# Patient Record
Sex: MALE | Race: ASIAN | Employment: UNEMPLOYED | URBAN - METROPOLITAN AREA
[De-identification: names, ages, dates, MRNs, and addresses within clinical notes are randomized per-mention and may not be internally consistent; named-entity substitution may affect disease eponyms.]

---

## 2021-01-15 DIAGNOSIS — Z23 NEED FOR VACCINATION: ICD-10-CM

## 2023-03-01 ENCOUNTER — APPOINTMENT (OUTPATIENT)
Dept: URGENT CARE | Facility: PHYSICIAN GROUP | Age: 78
End: 2023-03-01

## 2023-03-01 ENCOUNTER — OFFICE VISIT (OUTPATIENT)
Dept: URGENT CARE | Facility: PHYSICIAN GROUP | Age: 78
End: 2023-03-01
Payer: MEDICARE

## 2023-03-01 VITALS
WEIGHT: 126 LBS | HEIGHT: 60 IN | SYSTOLIC BLOOD PRESSURE: 122 MMHG | HEART RATE: 74 BPM | BODY MASS INDEX: 24.74 KG/M2 | OXYGEN SATURATION: 97 % | RESPIRATION RATE: 20 BRPM | TEMPERATURE: 98.7 F | DIASTOLIC BLOOD PRESSURE: 82 MMHG

## 2023-03-01 DIAGNOSIS — L50.9 URTICARIA: ICD-10-CM

## 2023-03-01 PROCEDURE — 99203 OFFICE O/P NEW LOW 30 MIN: CPT | Performed by: PHYSICIAN ASSISTANT

## 2023-03-01 RX ORDER — PREDNISONE 10 MG/1
20 TABLET ORAL 2 TIMES DAILY
Qty: 20 TABLET | Refills: 0 | Status: SHIPPED | OUTPATIENT
Start: 2023-03-01 | End: 2023-03-06

## 2023-03-01 ASSESSMENT — ENCOUNTER SYMPTOMS
MUSCULOSKELETAL NEGATIVE: 1
CONSTITUTIONAL NEGATIVE: 1
RESPIRATORY NEGATIVE: 1
NEUROLOGICAL NEGATIVE: 1
CARDIOVASCULAR NEGATIVE: 1

## 2023-03-02 NOTE — PROGRESS NOTES
Subjective:     Bear Thao  is a 77 y.o. male who presents for Urticaria (All over body since 2/27/2023)       He presents today with generalized whole body urticarial reaction has been present since 2/27/2023.  He does state that the affected skin is pruritic in nature.  No recent lifestyle changes.  No swelling of the face or oral mucosa.  Denies fever/chills/sweats, chest pain, shortness of breath, nausea/vomiting, abdominal pain, diarrhea.     Review of Systems   Constitutional: Negative.    Respiratory: Negative.     Cardiovascular: Negative.    Musculoskeletal: Negative.    Skin:  Positive for itching and rash.   Neurological: Negative.     No Known Allergies  History reviewed. No pertinent past medical history.     Objective:   /82   Pulse 74   Temp 37.1 °C (98.7 °F) (Temporal)   Resp 20   Ht 1.524 m (5')   Wt 57.2 kg (126 lb)   SpO2 97%   BMI 24.61 kg/m²   Physical Exam  Vitals and nursing note reviewed.   Constitutional:       General: He is not in acute distress.     Appearance: He is not ill-appearing or toxic-appearing.   HENT:      Head: Normocephalic.      Comments: Examination of the head, mouth, oral mucosa were negative for angioedema or soft tissue swelling.     Nose: No rhinorrhea.   Eyes:      General: No scleral icterus.     Conjunctiva/sclera: Conjunctivae normal.   Pulmonary:      Effort: Pulmonary effort is normal. No respiratory distress.      Breath sounds: No stridor.   Musculoskeletal:      Cervical back: Neck supple.   Skin:     Comments: Skin examination does reveal urticarial hives present over various parts of the body to include chest, neck, scalp, back, upper extremities.   Neurological:      Mental Status: He is alert and oriented to person, place, and time.   Psychiatric:         Mood and Affect: Mood normal.         Behavior: Behavior normal.         Thought Content: Thought content normal.         Judgment: Judgment normal.           Diagnostic  testing: None    Assessment/Plan:     Encounter Diagnoses   Name Primary?    Urticaria           Plan for care for today's complaint includes prednisone, over-the-counter antihistamine medication for urticarial rash. moisturize following did discuss with the patient that he should moisturize following bathing.  No evidence of angioedema or oral mucosa swelling on exam today.  Vital signs are stable patient is well-appearing.  Continue to monitor symptoms and return to urgent care or follow-up with primary care provider if symptoms remain ongoing.  Follow-up in the emergency department if symptoms become severe, ER precautions discussed in office today..  Prescription for prednisone provided.    See AVS Instructions below for written guidance provided to patient on after-visit management and care in addition to our verbal discussion during the visit.    Please note that this dictation was created using voice recognition software. I have attempted to correct all errors, but there may be sound-alike, spelling, grammar and possibly content errors that I did not discover before finalizing the note.    Dieudonne White PA-C

## 2023-06-20 ENCOUNTER — OFFICE VISIT (OUTPATIENT)
Dept: URGENT CARE | Facility: PHYSICIAN GROUP | Age: 78
End: 2023-06-20
Payer: MEDICARE

## 2023-06-20 VITALS
OXYGEN SATURATION: 98 % | DIASTOLIC BLOOD PRESSURE: 64 MMHG | HEART RATE: 85 BPM | TEMPERATURE: 97.8 F | WEIGHT: 122 LBS | RESPIRATION RATE: 18 BRPM | BODY MASS INDEX: 21.62 KG/M2 | SYSTOLIC BLOOD PRESSURE: 128 MMHG | HEIGHT: 63 IN

## 2023-06-20 DIAGNOSIS — M10.071 ACUTE IDIOPATHIC GOUT OF RIGHT FOOT: ICD-10-CM

## 2023-06-20 PROCEDURE — 3078F DIAST BP <80 MM HG: CPT | Performed by: NURSE PRACTITIONER

## 2023-06-20 PROCEDURE — 99214 OFFICE O/P EST MOD 30 MIN: CPT | Performed by: NURSE PRACTITIONER

## 2023-06-20 PROCEDURE — 3074F SYST BP LT 130 MM HG: CPT | Performed by: NURSE PRACTITIONER

## 2023-06-20 RX ORDER — COLCHICINE 0.6 MG/1
0.6 TABLET ORAL 2 TIMES DAILY
COMMUNITY
Start: 2023-06-02

## 2023-06-20 RX ORDER — PREDNISONE 20 MG/1
TABLET ORAL
Qty: 10 TABLET | Refills: 0 | Status: SHIPPED | OUTPATIENT
Start: 2023-06-20

## 2023-06-20 ASSESSMENT — ENCOUNTER SYMPTOMS
DIARRHEA: 0
TINGLING: 0
FEVER: 0
NAUSEA: 0
MYALGIAS: 0
SENSORY CHANGE: 0
CHILLS: 0
HEADACHES: 0

## 2023-06-20 NOTE — PROGRESS NOTES
Subjective     Bear Thao is a 77 y.o. male who presents with Foot Problem (X 2 wk Rt foot pain, swelling)            HPI  New.  Patient is a 77-year-old male who presents with first metatarsal foot pain, and swelling for approximately 2 weeks.  The pain has worsened over time despite him taking colchicine.  He denies fever, chills, other joint pain, nausea, or diarrhea.  He does have a history of periodic episodes of gout in his past.    Patient has no known allergies.  Current Outpatient Medications on File Prior to Visit   Medication Sig Dispense Refill    colchicine (COLCRYS) 0.6 MG Tab Take 0.6 mg by mouth 2 times a day.       No current facility-administered medications on file prior to visit.     Social History     Socioeconomic History    Marital status:      Spouse name: Not on file    Number of children: Not on file    Years of education: Not on file    Highest education level: Not on file   Occupational History    Not on file   Tobacco Use    Smoking status: Never    Smokeless tobacco: Never   Vaping Use    Vaping Use: Never used   Substance and Sexual Activity    Alcohol use: Yes    Drug use: Never    Sexual activity: Not on file   Other Topics Concern    Not on file   Social History Narrative    Not on file     Social Determinants of Health     Financial Resource Strain: Not on file   Food Insecurity: Not on file   Transportation Needs: Not on file   Physical Activity: Not on file   Stress: Not on file   Social Connections: Not on file   Intimate Partner Violence: Not on file   Housing Stability: Not on file     Breast Cancer-related family history is not on file.      Review of Systems   Constitutional:  Negative for chills, fever and malaise/fatigue.   Gastrointestinal:  Negative for diarrhea and nausea.   Musculoskeletal:  Positive for joint pain. Negative for myalgias.   Neurological:  Negative for tingling, sensory change and headaches.              Objective     /64 (BP  "Location: Left arm, Patient Position: Sitting, BP Cuff Size: Adult)   Pulse 85   Temp 36.6 °C (97.8 °F) (Temporal)   Resp 18   Ht 1.6 m (5' 3\")   Wt 55.3 kg (122 lb)   SpO2 98%   BMI 21.61 kg/m²      Physical Exam  Constitutional:       Appearance: Normal appearance. He is not ill-appearing.   Cardiovascular:      Rate and Rhythm: Normal rate and regular rhythm.      Heart sounds: No murmur heard.  Pulmonary:      Effort: Pulmonary effort is normal.      Breath sounds: Normal breath sounds.   Musculoskeletal:      Right foot: Decreased range of motion. Swelling, tenderness and bony tenderness present.        Legs:    Skin:     General: Skin is warm and dry.      Capillary Refill: Capillary refill takes less than 2 seconds.   Neurological:      General: No focal deficit present.      Mental Status: He is alert and oriented to person, place, and time.                             Assessment & Plan        1. Acute idiopathic gout of right foot  predniSONE (DELTASONE) 20 MG Tab        Differential diagnosis, natural history, supportive care, and indications for immediate follow-up were discussed.                   "

## 2024-02-12 ENCOUNTER — APPOINTMENT (OUTPATIENT)
Dept: URGENT CARE | Facility: PHYSICIAN GROUP | Age: 79
End: 2024-02-12
Payer: COMMERCIAL

## 2024-03-19 ENCOUNTER — HOSPITAL ENCOUNTER (OUTPATIENT)
Dept: LAB | Facility: MEDICAL CENTER | Age: 79
End: 2024-03-19
Attending: INTERNAL MEDICINE
Payer: MEDICARE

## 2024-03-19 LAB
CANCER AG19-9 SERPL-ACNC: 1804 U/ML (ref 0–35)
FERRITIN SERPL-MCNC: 1483 NG/ML (ref 22–322)
FOLATE SERPL-MCNC: 11.4 NG/ML
IRON SATN MFR SERPL: 39 % (ref 15–55)
IRON SERPL-MCNC: 98 UG/DL (ref 50–180)
TIBC SERPL-MCNC: 251 UG/DL (ref 250–450)
UIBC SERPL-MCNC: 153 UG/DL (ref 110–370)
VIT B12 SERPL-MCNC: 973 PG/ML (ref 211–911)

## 2024-03-19 PROCEDURE — 83550 IRON BINDING TEST: CPT

## 2024-03-19 PROCEDURE — 82746 ASSAY OF FOLIC ACID SERUM: CPT

## 2024-03-19 PROCEDURE — 83540 ASSAY OF IRON: CPT

## 2024-03-19 PROCEDURE — 86301 IMMUNOASSAY TUMOR CA 19-9: CPT | Mod: GA

## 2024-03-19 PROCEDURE — 82607 VITAMIN B-12: CPT

## 2024-03-19 PROCEDURE — 36415 COLL VENOUS BLD VENIPUNCTURE: CPT

## 2024-03-19 PROCEDURE — 82728 ASSAY OF FERRITIN: CPT

## 2024-04-16 PROBLEM — K57.92 DIVERTICULITIS: Status: ACTIVE | Noted: 2024-04-16

## 2024-04-16 PROBLEM — D50.9 MICROCYTIC ANEMIA: Status: ACTIVE | Noted: 2024-04-16

## 2024-04-16 PROBLEM — K86.89 PANCREATIC MASS: Status: ACTIVE | Noted: 2024-04-16

## 2024-04-16 PROBLEM — I10 HTN (HYPERTENSION): Status: ACTIVE | Noted: 2024-04-16

## 2024-04-16 PROBLEM — N32.89 BLADDER WALL THICKENING: Status: ACTIVE | Noted: 2024-04-16

## 2024-04-16 NOTE — PROGRESS NOTES
Subjective:   4/17/2024  9:20 AM  Primary care physician: Pcp Pt States None  Referring Provider: Dr Parsons    Chief Complaint:   Chief Complaint   Patient presents with    New Patient     PANC BODY MASS  CT 3/25: 3.4 CM ([facility])  EUS: SPLEN VEIN OCCL  PATH: SUSPICIUOUS  CA 19-9: 1804        Diagnosis:   1. 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 High serum carbohydrate antigen 19-9 ()        3. Abdominal pain, unspecified abdominal location        4. Abnormal CT of the abdomen          History of presenting illness:    Bear Thao is a pleasant 78 y.o. male who presented I ER earlier this year with mild continuous epigastric pain.      on 3/19/24 very elevated at 1804.    CT ABDOMEN on 3/25/24 noted a 3.4 cm pancreatic body mass with splenic vein occlusion. Patient also reported recent weight loss of about 3 pounds.    EUS on 3/29/24 by Dr Parsons noted pancreas body mass, invasive into splenic vein with occlusion, irregular borders, at least 18 x 31 mm.    Pathology on 3/29/24 noted pancreas body with atypical cells, suspicious for malignancy    He is here today for surgical evaluation.  He was noting vague upper abdominal pain for about a month now and was evaluated in the urgent care 1 point prescribed some pain medication but did not have relief of his symptoms therefore CT scan was ultimately performed.  This showed a 3 and half centimeter mass in the body of the pancreas with splenic vein occlusion and on my review appears to be also involving the celiac axis.  He had an EUS with a biopsy by Dr. Parsons which showed atypical cells concerning for malignancy.  Today in clinic he states he still having ongoing abdominal pain.  He is taking oxycodone for this and does get some relief from it.  He is eating and having regular bowel movements.  He is never had any episodes of jaundice.  His most limiting symptom is his  pain.  He has had about a 6 pound weight loss over the last few weeks.  His mother had lung cancer but no personal history of cancer.  He is got no other significant medical problems.  Does not smoke drink or use recreational drugs.    No past medical history on file.  No past surgical history on file.  Allergies   Allergen Reactions    Ibuprofen Nausea     Other Reaction(s): Stomach Pain     Outpatient Encounter Medications as of 4/17/2024   Medication Sig Dispense Refill    oxyCODONE immediate-release (ROXICODONE) 5 MG Tab TAKE 1 TABLET BY MOUTH EVERY 6 HOURS AS NEEDED FOR 3 DAYS FOR UNCONTROLLED PAIN      colchicine (COLCRYS) 0.6 MG Tab Take 0.6 mg by mouth 2 times a day.      predniSONE (DELTASONE) 20 MG Tab Take 2 tabs daily for 5 days. 10 Tablet 0     No facility-administered encounter medications on file as of 4/17/2024.     Social History     Socioeconomic History    Marital status:      Spouse name: Not on file    Number of children: Not on file    Years of education: Not on file    Highest education level: Not on file   Occupational History    Not on file   Tobacco Use    Smoking status: Never    Smokeless tobacco: Never   Vaping Use    Vaping Use: Never used   Substance and Sexual Activity    Alcohol use: Yes    Drug use: Never    Sexual activity: Not on file   Other Topics Concern    Not on file   Social History Narrative    Not on file     Social Determinants of Health     Financial Resource Strain: Not on file   Food Insecurity: Not on file   Transportation Needs: Not on file   Physical Activity: Not on file   Stress: Not on file   Social Connections: Not on file   Intimate Partner Violence: Not on file   Housing Stability: Not on file      Social History     Tobacco Use   Smoking Status Never   Smokeless Tobacco Never     Social History     Substance and Sexual Activity   Alcohol Use Yes     Social History     Substance and Sexual Activity   Drug Use Never      No family history on  "file.    Review of Systems   Constitutional:  Negative for chills, fever, malaise/fatigue and weight loss.   HENT:  Positive for hearing loss. Negative for congestion, ear discharge, ear pain and nosebleeds.    Eyes:  Negative for blurred vision, double vision, photophobia, pain and discharge.   Respiratory:  Negative for cough, hemoptysis and sputum production.    Cardiovascular:  Negative for chest pain, palpitations, orthopnea and claudication.   Gastrointestinal:  Positive for abdominal pain and constipation. Negative for diarrhea, heartburn, nausea and vomiting.   Genitourinary:  Positive for frequency. Negative for dysuria, hematuria and urgency.   Musculoskeletal:  Negative for back pain, joint pain, myalgias and neck pain.   Skin:  Negative for itching and rash.   Neurological:  Negative for dizziness, tingling, tremors, sensory change, speech change, focal weakness and headaches.   Endo/Heme/Allergies:  Negative for environmental allergies. Does not bruise/bleed easily.   Psychiatric/Behavioral:  Negative for depression, hallucinations, substance abuse and suicidal ideas. The patient has insomnia. The patient is not nervous/anxious.         Objective:   /64 (BP Location: Left arm, Patient Position: Sitting)   Pulse 94   Temp 36.7 °C (98.1 °F) (Temporal)   Ht 1.575 m (5' 2\")   Wt 49.9 kg (110 lb)   SpO2 99%   BMI 20.12 kg/m²     Physical Exam  Constitutional:       General: He is not in acute distress.     Appearance: He is not ill-appearing.   HENT:      Head: Normocephalic.   Eyes:      General: No scleral icterus.     Pupils: Pupils are equal, round, and reactive to light.   Cardiovascular:      Rate and Rhythm: Normal rate and regular rhythm.   Pulmonary:      Effort: Pulmonary effort is normal. No respiratory distress.   Abdominal:      General: Abdomen is flat. There is no distension.      Palpations: Abdomen is soft.      Tenderness: There is abdominal tenderness.      Comments: Soft.  " Mildly tender in the upper abdomen and left upper quadrant.  No rebound or guarding.   Skin:     Coloration: Skin is not jaundiced.   Neurological:      General: No focal deficit present.      Mental Status: He is alert and oriented to person, place, and time.         Labs     Latest Reference Range & Units 03/19/24 08:39   Ca 19-9 0.0 - 35.0 U/mL 1804.0 (H)   (H): Data is abnormally high    Imaging  CT ABDOMEN (3/25/24)  IMPRESSION:  1. Approximately 3.4 cm mass in the distal body of the pancreas highly suspicious for malignant neoplastic process. This mass is impressing upon and probably invading into the splenic vein and the splenic vein is likely thrombosed.  2. There is a polypoid mass in the anterior superior aspect of the urinary bladder measuring 1.7 x 1.5 cm and a malignant transitional cell carcinoma is not excluded.  3. Diverticulosis in the colon.  4. Prostate gland is enlarged and is impressing upon the base of the urinary bladder.     Pathology  Path (3/29/24)      Procedures  EUS on 3/29/24    Diagnosis:     1. 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 High serum carbohydrate antigen 19-9 ()        3. Abdominal pain, unspecified abdominal location        4. Abnormal CT of the abdomen            Medical Decision Making:  Today's Assessment / Status / Plan:     78-year-old male with a newly diagnosed mass in the body of the pancreas.  EUS biopsy showed atypical cells which are consistent with a likely malignancy.  On my review of the imaging this appears to involve the body of the pancreas heading down and involving the celiac axis.  CA 19-9 is 1800    I had a long discussion with the patient and daughter today in regards to the imaging findings and biopsy results.  I explained to them that this is consistent with a pancreatic cancer.  I discussed the prognosis, staging and management options for tumor in this location.  On my review this does not appear to be upfront resectable  and would need to respond significantly to treatment in order to be surgically resectable especially in somebody at his age.  I classifies it as locally advanced based on his imaging.  I have ordered a CT pancreas protocol, PET scan, referral to palliative care and has already been referred to medical oncology to have contacted who will get him in to discuss systemic therapy options.  I also discussed with the patient that should he need a port we will get that taken care of and placed that surgically if Dr. Sorensen feels like he will need 1 for administration of his chemotherapy.  Patient and family asked many great questions all which were answered and they are in agreement with the plan as outlined.      I, Chung Odell MD have entered, reviewed and confirmed the above diagnosis related to this patient on this date of service, April 17, 2024     Chung Odell M.D.

## 2024-04-17 ENCOUNTER — OFFICE VISIT (OUTPATIENT)
Dept: SURGICAL ONCOLOGY | Facility: MEDICAL CENTER | Age: 79
End: 2024-04-17
Payer: COMMERCIAL

## 2024-04-17 ENCOUNTER — HOSPITAL ENCOUNTER (OUTPATIENT)
Dept: RADIOLOGY | Facility: MEDICAL CENTER | Age: 79
End: 2024-04-17

## 2024-04-17 VITALS
HEART RATE: 94 BPM | HEIGHT: 62 IN | WEIGHT: 110 LBS | DIASTOLIC BLOOD PRESSURE: 64 MMHG | OXYGEN SATURATION: 99 % | SYSTOLIC BLOOD PRESSURE: 118 MMHG | TEMPERATURE: 98.1 F | BODY MASS INDEX: 20.24 KG/M2

## 2024-04-17 DIAGNOSIS — G89.3 CANCER ASSOCIATED PAIN: ICD-10-CM

## 2024-04-17 DIAGNOSIS — R79.89 HIGH SERUM CARBOHYDRATE ANTIGEN 19-9 (CA19-9): ICD-10-CM

## 2024-04-17 DIAGNOSIS — R10.9 ABDOMINAL PAIN, UNSPECIFIED ABDOMINAL LOCATION: ICD-10-CM

## 2024-04-17 DIAGNOSIS — C25.9 PANCREATIC ADENOCARCINOMA (HCC): ICD-10-CM

## 2024-04-17 DIAGNOSIS — D37.8 NEOPLASM OF UNCERTAIN BEHAVIOR OF BODY OF PANCREAS: ICD-10-CM

## 2024-04-17 DIAGNOSIS — R93.5 ABNORMAL CT OF THE ABDOMEN: ICD-10-CM

## 2024-04-17 PROCEDURE — 3078F DIAST BP <80 MM HG: CPT | Performed by: SURGERY

## 2024-04-17 PROCEDURE — 3074F SYST BP LT 130 MM HG: CPT | Performed by: SURGERY

## 2024-04-17 PROCEDURE — 99204 OFFICE O/P NEW MOD 45 MIN: CPT | Performed by: SURGERY

## 2024-04-17 RX ORDER — OXYCODONE HYDROCHLORIDE 5 MG/1
5 TABLET ORAL EVERY 4 HOURS PRN
Qty: 30 TABLET | Refills: 0 | Status: SHIPPED | OUTPATIENT
Start: 2024-04-17 | End: 2024-04-24

## 2024-04-17 RX ORDER — OXYCODONE HYDROCHLORIDE 5 MG/1
TABLET ORAL
COMMUNITY
Start: 2024-03-25

## 2024-04-17 ASSESSMENT — ENCOUNTER SYMPTOMS
TINGLING: 0
SPUTUM PRODUCTION: 0
DIARRHEA: 0
MYALGIAS: 0
HEARTBURN: 0
HEADACHES: 0
WEIGHT LOSS: 0
FOCAL WEAKNESS: 0
HEMOPTYSIS: 0
VOMITING: 0
DIZZINESS: 0
NAUSEA: 0
TREMORS: 0
SPEECH CHANGE: 0
BLURRED VISION: 0
EYE DISCHARGE: 0
ABDOMINAL PAIN: 1
BACK PAIN: 0
HALLUCINATIONS: 0
ORTHOPNEA: 0
CLAUDICATION: 0
COUGH: 0
SENSORY CHANGE: 0
CONSTIPATION: 1
CHILLS: 0
INSOMNIA: 1
NECK PAIN: 0
NERVOUS/ANXIOUS: 0
PHOTOPHOBIA: 0
DOUBLE VISION: 0
DEPRESSION: 0
EYE PAIN: 0
FEVER: 0
PALPITATIONS: 0
BRUISES/BLEEDS EASILY: 0

## 2024-04-17 ASSESSMENT — LIFESTYLE VARIABLES: SUBSTANCE_ABUSE: 0

## 2024-04-19 ENCOUNTER — TELEPHONE (OUTPATIENT)
Dept: SURGICAL ONCOLOGY | Facility: MEDICAL CENTER | Age: 79
End: 2024-04-19
Payer: COMMERCIAL

## 2024-04-22 ENCOUNTER — TELEPHONE (OUTPATIENT)
Dept: HEMATOLOGY ONCOLOGY | Facility: MEDICAL CENTER | Age: 79
End: 2024-04-22
Payer: COMMERCIAL

## 2024-04-30 ENCOUNTER — TELEPHONE (OUTPATIENT)
Dept: SURGICAL ONCOLOGY | Facility: MEDICAL CENTER | Age: 79
End: 2024-04-30
Payer: COMMERCIAL

## 2024-04-30 NOTE — TELEPHONE ENCOUNTER
Patient's daughter Pam gave us a call today to let us know that the imaging orders have not been received by [facility], she gave me a direct fax number so I faxed that over to [phone].

## 2024-05-09 ENCOUNTER — HOSPITAL ENCOUNTER (OUTPATIENT)
Dept: HEMATOLOGY ONCOLOGY | Facility: MEDICAL CENTER | Age: 79
End: 2024-05-09
Attending: STUDENT IN AN ORGANIZED HEALTH CARE EDUCATION/TRAINING PROGRAM
Payer: COMMERCIAL

## 2024-05-09 ENCOUNTER — HOSPITAL ENCOUNTER (OUTPATIENT)
Dept: HEMATOLOGY ONCOLOGY | Facility: MEDICAL CENTER | Age: 79
End: 2024-05-09
Attending: FAMILY MEDICINE
Payer: COMMERCIAL

## 2024-05-09 VITALS
HEART RATE: 86 BPM | DIASTOLIC BLOOD PRESSURE: 62 MMHG | SYSTOLIC BLOOD PRESSURE: 122 MMHG | WEIGHT: 107 LBS | BODY MASS INDEX: 19.69 KG/M2 | HEIGHT: 62 IN | RESPIRATION RATE: 16 BRPM | TEMPERATURE: 97.6 F | OXYGEN SATURATION: 99 %

## 2024-05-09 VITALS
HEIGHT: 62 IN | TEMPERATURE: 97.6 F | SYSTOLIC BLOOD PRESSURE: 122 MMHG | BODY MASS INDEX: 19.69 KG/M2 | DIASTOLIC BLOOD PRESSURE: 62 MMHG | OXYGEN SATURATION: 99 % | HEART RATE: 88 BPM | WEIGHT: 107 LBS

## 2024-05-09 DIAGNOSIS — C25.9 PANCREATIC ADENOCARCINOMA (HCC): ICD-10-CM

## 2024-05-09 DIAGNOSIS — G89.3 CANCER RELATED PAIN: ICD-10-CM

## 2024-05-09 DIAGNOSIS — K86.89 PANCREATIC MASS: ICD-10-CM

## 2024-05-09 PROCEDURE — 99205 OFFICE O/P NEW HI 60 MIN: CPT | Performed by: STUDENT IN AN ORGANIZED HEALTH CARE EDUCATION/TRAINING PROGRAM

## 2024-05-09 PROCEDURE — 99204 OFFICE O/P NEW MOD 45 MIN: CPT | Performed by: FAMILY MEDICINE

## 2024-05-09 RX ORDER — 0.9 % SODIUM CHLORIDE 0.9 %
10 VIAL (ML) INJECTION PRN
OUTPATIENT
Start: 2024-05-23

## 2024-05-09 RX ORDER — ONDANSETRON 8 MG/1
8 TABLET, ORALLY DISINTEGRATING ORAL PRN
OUTPATIENT
Start: 2024-05-23

## 2024-05-09 RX ORDER — 0.9 % SODIUM CHLORIDE 0.9 %
VIAL (ML) INJECTION PRN
OUTPATIENT
Start: 2024-05-23

## 2024-05-09 RX ORDER — PROCHLORPERAZINE MALEATE 10 MG
10 TABLET ORAL EVERY 6 HOURS PRN
OUTPATIENT
Start: 2024-05-23

## 2024-05-09 RX ORDER — 0.9 % SODIUM CHLORIDE 0.9 %
10 VIAL (ML) INJECTION PRN
OUTPATIENT
Start: 2024-05-22

## 2024-05-09 RX ORDER — 0.9 % SODIUM CHLORIDE 0.9 %
20 VIAL (ML) INJECTION PRN
OUTPATIENT
Start: 2024-05-25

## 2024-05-09 RX ORDER — EPINEPHRINE 1 MG/ML(1)
0.5 AMPUL (ML) INJECTION PRN
OUTPATIENT
Start: 2024-05-23

## 2024-05-09 RX ORDER — METHYLPREDNISOLONE SODIUM SUCCINATE 125 MG/2ML
125 INJECTION, POWDER, LYOPHILIZED, FOR SOLUTION INTRAMUSCULAR; INTRAVENOUS PRN
OUTPATIENT
Start: 2024-05-23

## 2024-05-09 RX ORDER — 0.9 % SODIUM CHLORIDE 0.9 %
VIAL (ML) INJECTION PRN
OUTPATIENT
Start: 2024-05-22

## 2024-05-09 RX ORDER — 0.9 % SODIUM CHLORIDE 0.9 %
3 VIAL (ML) INJECTION PRN
OUTPATIENT
Start: 2024-05-22

## 2024-05-09 RX ORDER — 0.9 % SODIUM CHLORIDE 0.9 %
3 VIAL (ML) INJECTION PRN
OUTPATIENT
Start: 2024-05-23

## 2024-05-09 RX ORDER — OXYCODONE HYDROCHLORIDE 5 MG/1
5 TABLET ORAL EVERY 4 HOURS PRN
Qty: 30 TABLET | Refills: 0 | Status: SHIPPED | OUTPATIENT
Start: 2024-05-09 | End: 2024-05-16

## 2024-05-09 RX ORDER — ATROPINE SULFATE 1 MG/ML
0.5 INJECTION, SOLUTION INTRAVENOUS PRN
OUTPATIENT
Start: 2024-05-23

## 2024-05-09 RX ORDER — DIPHENHYDRAMINE HYDROCHLORIDE 50 MG/ML
50 INJECTION INTRAMUSCULAR; INTRAVENOUS PRN
OUTPATIENT
Start: 2024-05-23

## 2024-05-09 RX ORDER — ONDANSETRON 2 MG/ML
4 INJECTION INTRAMUSCULAR; INTRAVENOUS PRN
OUTPATIENT
Start: 2024-05-23

## 2024-05-09 RX ORDER — ACETAMINOPHEN 325 MG/1
TABLET ORAL
COMMUNITY

## 2024-05-09 RX ORDER — DEXTROSE MONOHYDRATE 50 MG/ML
INJECTION, SOLUTION INTRAVENOUS CONTINUOUS
OUTPATIENT
Start: 2024-05-23

## 2024-05-09 RX ORDER — LOPERAMIDE HYDROCHLORIDE 2 MG/1
4 CAPSULE ORAL PRN
OUTPATIENT
Start: 2024-05-23

## 2024-05-09 RX ORDER — LOPERAMIDE HYDROCHLORIDE 2 MG/1
2 CAPSULE ORAL
OUTPATIENT
Start: 2024-05-23

## 2024-05-09 ASSESSMENT — PAIN SCALES - GENERAL: PAINLEVEL: NO PAIN

## 2024-05-09 ASSESSMENT — ENCOUNTER SYMPTOMS
HEARTBURN: 0
SPUTUM PRODUCTION: 0
CHILLS: 0
WEAKNESS: 0
BLURRED VISION: 0
DOUBLE VISION: 0
VOMITING: 0
ABDOMINAL PAIN: 1
SINUS PAIN: 0
HEADACHES: 0
NERVOUS/ANXIOUS: 0
BRUISES/BLEEDS EASILY: 0
PALPITATIONS: 0
WHEEZING: 0
NAUSEA: 0
SHORTNESS OF BREATH: 0
BACK PAIN: 1
MYALGIAS: 0
WEIGHT LOSS: 1
BLOOD IN STOOL: 0
SORE THROAT: 0
TINGLING: 0
DIAPHORESIS: 0
ORTHOPNEA: 0
CONSTIPATION: 1
INSOMNIA: 0
COUGH: 0
FEVER: 0
DIARRHEA: 0
DIZZINESS: 0

## 2024-05-09 NOTE — PROGRESS NOTES
Consult:  Hematology/Oncology      Referring Physician: Jack Odell MD  Primary Care:  Estrella Oh M.D.    Diagnosis: Pancreatic adenocarcinoma    Chief Complaint:  Evaluation for systemic therapy    History of Presenting Illness:  Bear Thao is a 78 y.o.  man who presents to the clinic for evaluation for systemic therapy for a new diagnosis of pancreatic adenocarcinoma.  The patient has been experiencing weight loss, abdominal pain, fatigue, and malaise for several months and went to the hospital for evaluation.  He had a CT scan which revealed a 3.4 cm pancreatic body mass with splenic vein occlusion, as well as involvement of the celiac axis.  A CA 19-9 was drawn at that time and was found to be 1804.  He had endoscopic ultrasound done and brushings were found to reveal atypical cells suspicious for malignancy.  He was referred to surgical oncology and due to the locally advanced nature of his disease, was felt to not be a surgical candidate at this time.  He was referred for evaluation for systemic therapy.    Interval History:  Patient is here for consultation.  He has some abdominal pain that radiates to his back, and has not been able to eat very well due to pain.  He takes oxycodone at night to help him sleep, which works well.  He has been struggling with constipation and uses laxatives to help with this.  He otherwise is doing okay, and wants to know about with potential options he has.  His son is with him today.    Past Medical History:   Diagnosis Date    Cancer (HCC)     Pancreas cancer (HCC)        History reviewed. No pertinent surgical history.    Social History     Socioeconomic History    Marital status:      Spouse name: Not on file    Number of children: Not on file    Years of education: Not on file    Highest education level: Not on file   Occupational History    Not on file   Tobacco Use    Smoking status: Never    Smokeless tobacco: Never   Vaping Use    Vaping  Use: Never used   Substance and Sexual Activity    Alcohol use: Yes    Drug use: Never    Sexual activity: Not on file   Other Topics Concern    Not on file   Social History Narrative    Lives at home with his wife. Family (children) live nearby.      Social Determinants of Health     Financial Resource Strain: Not on file   Food Insecurity: Not on file   Transportation Needs: Not on file   Physical Activity: Not on file   Stress: Not on file   Social Connections: Not on file   Intimate Partner Violence: Not on file   Housing Stability: Not on file       Family History   Problem Relation Age of Onset    Cancer Mother         Lung cancer       OB History   No obstetric history on file.       Allergies as of 05/09/2024 - Reviewed 05/09/2024   Allergen Reaction Noted    Ibuprofen Nausea 04/17/2024         Current Outpatient Medications:     acetaminophen (TYLENOL) 325 MG Tab, 0, Disp: , Rfl:     magnesium hydroxide (MILK OF MAGNESIA) 400 MG/5ML Suspension, Take 30 mL by mouth 1 time a day as needed., Disp: , Rfl:     oxyCODONE immediate-release (ROXICODONE) 5 MG Tab, TAKE 1 TABLET BY MOUTH EVERY 6 HOURS AS NEEDED FOR 3 DAYS FOR UNCONTROLLED PAIN, Disp: , Rfl:     colchicine (COLCRYS) 0.6 MG Tab, Take 0.6 mg by mouth 2 times a day. (Patient not taking: Reported on 5/9/2024), Disp: , Rfl:     predniSONE (DELTASONE) 20 MG Tab, Take 2 tabs daily for 5 days. (Patient not taking: Reported on 5/9/2024), Disp: 10 Tablet, Rfl: 0    Review of Systems:  Review of Systems   Constitutional:  Positive for malaise/fatigue and weight loss. Negative for chills, diaphoresis and fever.   HENT:  Negative for hearing loss, nosebleeds, sinus pain and sore throat.    Eyes:  Negative for blurred vision and double vision.   Respiratory:  Negative for cough, sputum production, shortness of breath and wheezing.    Cardiovascular:  Negative for chest pain, palpitations, orthopnea and leg swelling.   Gastrointestinal:  Positive for abdominal pain  "and constipation. Negative for blood in stool, diarrhea, heartburn, melena, nausea and vomiting.   Genitourinary:  Negative for dysuria, frequency, hematuria and urgency.   Musculoskeletal:  Positive for back pain. Negative for joint pain and myalgias.   Skin:  Negative for rash.   Neurological:  Negative for dizziness, tingling, weakness and headaches.   Endo/Heme/Allergies:  Does not bruise/bleed easily.   Psychiatric/Behavioral:  The patient is not nervous/anxious and does not have insomnia.           Physical Exam:  Vitals:    05/09/24 0807   BP: 122/62   Pulse: 86   Resp: 16   Temp: 36.4 °C (97.6 °F)   TempSrc: Temporal   SpO2: 99%   Weight: 48.5 kg (107 lb)   Height: 1.575 m (5' 2.01\")       DESC; KARNOFSKY SCALE WITH ECOG EQUIVALENT: 100, Fully active, able to carry on all pre-disease performed without restriction (ECOG equivalent 0)    DISTRESS LEVEL: no apparent distress    Physical Exam  Vitals and nursing note reviewed.   Constitutional:       General: He is awake. He is not in acute distress.     Appearance: Normal appearance. He is normal weight. He is not ill-appearing, toxic-appearing or diaphoretic.   HENT:      Head: Normocephalic and atraumatic.      Nose: Nose normal. No congestion.      Mouth/Throat:      Pharynx: Oropharynx is clear. No oropharyngeal exudate or posterior oropharyngeal erythema.   Eyes:      General: No scleral icterus.     Extraocular Movements: Extraocular movements intact.      Conjunctiva/sclera: Conjunctivae normal.      Pupils: Pupils are equal, round, and reactive to light.   Cardiovascular:      Rate and Rhythm: Normal rate and regular rhythm.      Pulses: Normal pulses.      Heart sounds: Normal heart sounds. No murmur heard.     No friction rub. No gallop.   Pulmonary:      Effort: Pulmonary effort is normal.      Breath sounds: Normal breath sounds. No decreased air movement. No wheezing, rhonchi or rales.   Abdominal:      General: Bowel sounds are normal. There is no " distension.      Tenderness: There is no abdominal tenderness.   Musculoskeletal:         General: No deformity. Normal range of motion.      Cervical back: Normal range of motion and neck supple. No tenderness.      Right lower leg: No edema.      Left lower leg: No edema.   Lymphadenopathy:      Cervical: No cervical adenopathy.      Upper Body:      Right upper body: No axillary adenopathy.      Left upper body: No axillary adenopathy.      Lower Body: No right inguinal adenopathy. No left inguinal adenopathy.   Skin:     General: Skin is warm and dry.      Coloration: Skin is not jaundiced.      Findings: No erythema or rash.   Neurological:      General: No focal deficit present.      Mental Status: He is alert and oriented to person, place, and time.      Sensory: Sensation is intact.      Motor: Motor function is intact. No weakness.      Gait: Gait is intact.   Psychiatric:         Attention and Perception: Attention normal.         Mood and Affect: Mood normal.         Behavior: Behavior normal. Behavior is cooperative.         Thought Content: Thought content normal.         Judgment: Judgment normal.          Depression Screening    Little interest or pleasure in doing things?      Feeling down, depressed , or hopeless?     Trouble falling or staying asleep, or sleeping too much?      Feeling tired or having little energy?      Poor appetite or overeating?      Feeling bad about yourself - or that you are a failure or have let yourself or your family down?     Trouble concentrating on things, such as reading the newspaper or watching television?     Moving or speaking so slowly that other people could have noticed.  Or the opposite - being so fidgety or restless that you have been moving around a lot more than usual?      Thoughts that you would be better off dead, or of hurting yourself?      Patient Health Questionnaire Score:         If depressive symptoms identified deferred to follow up visit unless  specifically addressed in assesment and plan.    Interpretation of PHQ-9 Total Score   Score Severity   1-4 No Depression   5-9 Mild Depression   10-14 Moderate Depression   15-19 Moderately Severe Depression   20-27 Severe Depression    Labs:  No visits with results within 7 Day(s) from this visit.   Latest known visit with results is:   Hospital Outpatient Visit on 03/19/2024   Component Date Value Ref Range Status    Iron 03/19/2024 98  50 - 180 ug/dL Final    Total Iron Binding 03/19/2024 251  250 - 450 ug/dL Final    Unsat Iron Binding 03/19/2024 153  110 - 370 ug/dL Final    % Saturation 03/19/2024 39  15 - 55 % Final    Ferritin 03/19/2024 1483.0 (H)  22.0 - 322.0 ng/mL Final    Vitamin B12 -True Cobalamin 03/19/2024 973 (H)  211 - 911 pg/mL Final    Folate -Folic Acid 03/19/2024 11.4  >4.0 ng/mL Final    Ca 19-9 03/19/2024 1804.0 (H)  0.0 - 35.0 U/mL Final    Comment: Results obtained by dilution.  Performed using Roche tatiana e immunoassay analyzer.  CA 19 9 values  determined on patient samples by different testing procedures cannot be  directly compared with one another and could be the cause of erroneous  medical interpretations. If there is a change in the CA 19 9 assay procedure  used while monitoring therapy, then new baselines may need to be established  when comparing previous results.         Imaging:   All listed images below have been independently reviewed by me. I agree with the findings as summarized below:    No results found.     Pathology:      Assessment & Plan:  1. Pancreatic mass        2. Pancreatic adenocarcinoma (HCC)            This is an 78 year old  man with pancreatic adenocarcinoma, at least [id number] stage III disease. He presents for evaluation.     Current Diagnosis and Staging: Pancreatic adenocarcinoma, [id number] stage III disease    Treatment Plan: NALIRIFOX    Treatment Citation: BLAKE-3 trial, Lancet 2023    Plan of Care:    Primary Therapy: NALIRIFOX to begin in 2-3  weeks  Supportive Therapy: Antiemetics per protocol. Celiac plexus nerve block.  Toxicity: Patient is getting antineoplastic therapy and needs monitoring of blood counts, hepatic function, and renal function due to potential for organ dysfunction.   Labs: CBC with diff, CMP, Ca 19-9 monitoring  Imaging: Repeat CT scans after C4 (CT pancreatic protocol, CT chest). PET scan is pending.   Treatment Planning: Patient has locally advanced disease and so is not a surgical candidate at this time. Will start palliative treatment with NALIRIFOX and then potentially incorporate radiation therapy. If patient does become a surgical candidate, then will plan accordingly.   Consultations: Surgical oncology (Dr. Odell); GI (Dr. Parsons); Palliative care (Dr. Sharma)  Code Status: Full  Miscellaneous: Referred to genetics  Return for Follow Up: For start of therapy    Any questions and concerns raised by the patient were answered to the best of my ability. Thank you for allowing me to participate in the care for this patient. Please feel free to contact me for any questions or concerns.     Total time spent on chart review, clinic encounter, and documentation: 63 minutes.

## 2024-05-10 ENCOUNTER — HOSPITAL ENCOUNTER (OUTPATIENT)
Facility: MEDICAL CENTER | Age: 79
End: 2024-05-10
Attending: SURGERY | Admitting: SURGERY
Payer: COMMERCIAL

## 2024-05-13 ENCOUNTER — HOSPITAL ENCOUNTER (OUTPATIENT)
Facility: MEDICAL CENTER | Age: 79
End: 2024-05-13
Attending: UROLOGY
Payer: COMMERCIAL

## 2024-05-13 ENCOUNTER — TELEPHONE (OUTPATIENT)
Dept: SURGICAL ONCOLOGY | Facility: MEDICAL CENTER | Age: 79
End: 2024-05-13
Payer: COMMERCIAL

## 2024-05-13 ENCOUNTER — TELEPHONE (OUTPATIENT)
Dept: HEMATOLOGY ONCOLOGY | Facility: MEDICAL CENTER | Age: 79
End: 2024-05-13

## 2024-05-13 DIAGNOSIS — C25.9 PANCREATIC ADENOCARCINOMA (HCC): ICD-10-CM

## 2024-05-13 PROBLEM — G89.3 CANCER RELATED PAIN: Status: ACTIVE | Noted: 2024-05-13

## 2024-05-13 ASSESSMENT — ENCOUNTER SYMPTOMS
PALPITATIONS: 0
BLURRED VISION: 0
NAUSEA: 0
ABDOMINAL PAIN: 1
FEVER: 0
DIARRHEA: 0
SHORTNESS OF BREATH: 0
WEIGHT LOSS: 1
DEPRESSION: 0
HEADACHES: 0
VOMITING: 0
NERVOUS/ANXIOUS: 0
CONSTIPATION: 1
CHILLS: 0
BRUISES/BLEEDS EASILY: 0
BACK PAIN: 0
COUGH: 0

## 2024-05-13 NOTE — ASSESSMENT & PLAN NOTE
Discussed with patient's his goals of care surrounding his pancreatic cancer.  At this time he would like to proceed with treatments.  Did talk about the risks of treatment and the role of quality of life.  Patient will discuss with his family.  Will continue goals of care conversations at next visit.

## 2024-05-13 NOTE — TELEPHONE ENCOUNTER
Patient's daughter called wanting to know if PET CT was needed prior to surgery? It has still not been done nor is scheduled, I have refaxed it to [facility] [phone]    Please advise,

## 2024-05-13 NOTE — TELEPHONE ENCOUNTER
"MYNOR contacted patient's daughter, Isa to discuss the insurance situation.    (The patient has had a [facility] Medicare Advantage Plan all along. -  Unfortunately, the insurance was loaded into [facility] as [facility] Commercial.  -  It was not discovered until Thursday, May 9th, by the Infusion department when they were attempting to get authorization for his infusions).    MYNOR explained that [facility] is not contracted with [facility] Medicare Advantage plan, and that Dr. Sorensen will be referring the patient to [facility], or another clinic that takes his insurance.    MYNOR told Isa that the appointments with Medical Oncology on 5/16/24 will be cancelled.  MYNOR strongly suggested that the appointment for surgery on 5/16/24 also be cancelled.    Isa stated \"this is unfortunate\", and \"what are we going to do about any out of network charges?\"  \"What about any bills we receive because of this?\"    ([facility] does not have authority to discuss the billing situation)    Isa has the [facility] phone number ([phone]) and may call back with further questions.      "

## 2024-05-13 NOTE — TELEPHONE ENCOUNTER
Isa called - she wants to get the surgery done by Dr. Odell.  (Surgical Oncology is contracted with [facility] Medicare Advantage Plan)    For Medical Oncology, please send referral STAT of [facility].

## 2024-05-13 NOTE — ASSESSMENT & PLAN NOTE
Patient with known pancreatic cancer stage III.  Has chronic abdominal pain secondary to his cancer.  Has been using oxycodone 5 mg with good relief.  Pain is also exacerbated by constipation or hunger.  Did discuss options including continue current oxycodone as well as celiac plexus block.  Patient would like to think about the role of the celiac plexus block.  Will continue with oxycodone 5 mg.  Also discussed use of MiraLAX and senna to assist with constipation.  Will follow-up in 1 week to reevaluate for celiac plexus block.  PDMP reviewed, no signs of diversion abuse, risk and benefits of medication discussed, controlled subs agreement completed today.  Follow-up in 1 week.

## 2024-05-13 NOTE — PROGRESS NOTES
Subjective:     Chief Complaint   Patient presents with    New Patient     [facility]/ [facility]/ pain mgmnt     Bear Thao is a 78 y.o. male here today accompanied by his son to establish with palliative care for symptom management and goals of care.  We spent the first part of the visit learning about the patient's history leading to the diagnosis of pancreatic cancer.  He does share he has abdominal pain that does worsen with constipation or when he is hungry.  The pain is generally diffuse across his abdomen and occasionally radiates to his back.  As long as he stays on top of his constipation the pain is minimal.  Has been using oxycodone 5 mg with relief.  We did discuss the role of a celiac plexus block and patient would like to think about this before deciding on proceeding with the procedure.  We then discussed goals of care and that his cancer is not curable but it is the goal of treatment to provide quality of time in the future.  I did express some concern due to his advanced age on how he may tolerate his cancer treatments.  We also briefly discussed CODE STATUS and advance care planning.  We concluded the visit with a plan to follow-up in 1 week to reevaluate his pain to decide on acid plexus block and to have further goals of care discussion.  Does not endorse any significant nausea or vomiting or diarrhea at this time.  Is taking milk of magnesia for constipation.    Problem   Cancer Related Pain   Pancreatic Adenocarcinoma (Hcc)        Current medicines (including changes today)  Current Outpatient Medications   Medication Sig Dispense Refill    oxyCODONE immediate-release (ROXICODONE) 5 MG Tab Take 1 Tablet by mouth every four hours as needed for Severe Pain for up to 7 days. 30 Tablet 0    acetaminophen (TYLENOL) 325 MG Tab 0      magnesium hydroxide (MILK OF MAGNESIA) 400 MG/5ML Suspension Take 30 mL by mouth 1 time a day as needed.      oxyCODONE immediate-release (ROXICODONE) 5 MG Tab TAKE  "1 TABLET BY MOUTH EVERY 6 HOURS AS NEEDED FOR 3 DAYS FOR UNCONTROLLED PAIN      colchicine (COLCRYS) 0.6 MG Tab Take 0.6 mg by mouth 2 times a day. (Patient not taking: Reported on 5/9/2024)      predniSONE (DELTASONE) 20 MG Tab Take 2 tabs daily for 5 days. (Patient not taking: Reported on 5/9/2024) 10 Tablet 0     No current facility-administered medications for this encounter.       Social History     Tobacco Use    Smoking status: Never    Smokeless tobacco: Never   Vaping Use    Vaping Use: Never used   Substance Use Topics    Alcohol use: Yes    Drug use: Never     Past Medical History:   Diagnosis Date    Cancer (HCC)     Pancreas cancer (HCC)       Family History   Problem Relation Age of Onset    Cancer Mother         Lung cancer         Objective:     Vitals:    05/09/24 0855   BP: 122/62   Pulse: 88   Temp: 36.4 °C (97.6 °F)   TempSrc: Temporal   SpO2: 99%   Weight: 48.5 kg (107 lb)   Height: 1.575 m (5' 2\")     Body mass index is 19.57 kg/m².     Review of Systems   Constitutional:  Positive for malaise/fatigue and weight loss. Negative for chills and fever.   HENT:  Negative for congestion and hearing loss.    Eyes:  Negative for blurred vision.   Respiratory:  Negative for cough and shortness of breath.    Cardiovascular:  Negative for chest pain and palpitations.   Gastrointestinal:  Positive for abdominal pain and constipation. Negative for diarrhea, nausea and vomiting.   Musculoskeletal:  Negative for back pain and joint pain.   Skin:  Negative for rash.   Neurological:  Negative for headaches.   Endo/Heme/Allergies:  Does not bruise/bleed easily.   Psychiatric/Behavioral:  Negative for depression. The patient is not nervous/anxious.      Physical Exam:   Gen: no acute distress.   Skin: Pink, warm, and dry  HEENT: conjunctiva non-injected, sclera non-icteric. EOMs intact.   Nasal mucosa without edema nor erythema.   Pinna normal.    Oral mucous membranes pink and moist with no lesions.  Neck: " Supple, trachea midline. No adenopathy or masses in the neck or supraclavicular regions.  Lungs: Effort is normal.   CV: regular rate and rhythm  Abdomen: Flat  Ext: no edema, color normal, vascularity normal, temperature normal  Alert and oriented Eye contact is good, speech goal directed, affect calm    Assessment and Plan:   Cancer related pain  Patient with known pancreatic cancer stage III.  Has chronic abdominal pain secondary to his cancer.  Has been using oxycodone 5 mg with good relief.  Pain is also exacerbated by constipation or hunger.  Did discuss options including continue current oxycodone as well as celiac plexus block.  Patient would like to think about the role of the celiac plexus block.  Will continue with oxycodone 5 mg.  Also discussed use of MiraLAX and senna to assist with constipation.  Will follow-up in 1 week to reevaluate for celiac plexus block.  PDMP reviewed, no signs of diversion abuse, risk and benefits of medication discussed, controlled subs agreement completed today.  Follow-up in 1 week.    Pancreatic adenocarcinoma (HCC)  Discussed with patient's his goals of care surrounding his pancreatic cancer.  At this time he would like to proceed with treatments.  Did talk about the risks of treatment and the role of quality of life.  Patient will discuss with his family.  Will continue goals of care conversations at next visit.    39 minutes in total spent on patient encounter including chart review and consultation with patient oncological providers.    Followup: No follow-ups on file.    Akhil Sharma M.D.

## 2024-05-14 DIAGNOSIS — C25.9 PANCREATIC ADENOCARCINOMA (HCC): ICD-10-CM

## 2024-05-15 ENCOUNTER — TELEPHONE (OUTPATIENT)
Dept: SURGICAL ONCOLOGY | Facility: MEDICAL CENTER | Age: 79
End: 2024-05-15
Payer: COMMERCIAL

## 2024-05-15 ENCOUNTER — HOSPITAL ENCOUNTER (OUTPATIENT)
Dept: RADIOLOGY | Facility: MEDICAL CENTER | Age: 79
End: 2024-05-15
Attending: SURGERY
Payer: COMMERCIAL

## 2024-05-16 ENCOUNTER — APPOINTMENT (OUTPATIENT)
Dept: HEMATOLOGY ONCOLOGY | Facility: MEDICAL CENTER | Age: 79
End: 2024-05-16
Payer: COMMERCIAL

## 2024-05-16 LAB
BACTERIA UR CULT: NORMAL
SIGNIFICANT IND 70042: NORMAL
SITE SITE: NORMAL
SOURCE SOURCE: NORMAL

## 2024-05-20 ENCOUNTER — TELEPHONE (OUTPATIENT)
Dept: SURGICAL ONCOLOGY | Facility: MEDICAL CENTER | Age: 79
End: 2024-05-20
Payer: COMMERCIAL

## 2024-05-29 NOTE — TELEPHONE ENCOUNTER
Daughter is calling - asking if [facility] has received this referral.    If not, please send it ASAP.

## 2024-08-13 ENCOUNTER — APPOINTMENT (OUTPATIENT)
Dept: LAB | Facility: MEDICAL CENTER | Age: 79
End: 2024-08-13
Payer: COMMERCIAL

## 2024-08-27 ENCOUNTER — PHARMACY VISIT (OUTPATIENT)
Dept: PHARMACY | Facility: MEDICAL CENTER | Age: 79
End: 2024-08-27
Payer: COMMERCIAL

## 2024-08-27 PROCEDURE — RXMED WILLOW AMBULATORY MEDICATION CHARGE: Performed by: INTERNAL MEDICINE

## 2024-08-27 RX ORDER — HALOPERIDOL 2 MG/ML
SOLUTION ORAL
Qty: 30 ML | Refills: 0 | OUTPATIENT
Start: 2024-08-27

## 2024-08-27 RX ORDER — AMOXICILLIN 250 MG
CAPSULE ORAL
Qty: 30 TABLET | Refills: 0 | OUTPATIENT
Start: 2024-08-27

## 2024-08-27 RX ORDER — MIRTAZAPINE 15 MG/1
TABLET, FILM COATED ORAL
Qty: 30 TABLET | Refills: 0 | OUTPATIENT
Start: 2024-08-27

## 2024-08-27 RX ORDER — FUROSEMIDE 40 MG
TABLET ORAL
Qty: 7 TABLET | Refills: 0 | OUTPATIENT
Start: 2024-08-27

## 2024-08-27 RX ORDER — BACLOFEN 5 MG/1
TABLET ORAL
Qty: 15 TABLET | Refills: 0 | OUTPATIENT
Start: 2024-08-27

## 2024-08-27 RX ORDER — LORAZEPAM 2 MG/ML
CONCENTRATE ORAL
Qty: 30 ML | Refills: 0 | OUTPATIENT
Start: 2024-08-27

## 2024-08-27 RX ORDER — PROMETHAZINE HYDROCHLORIDE 25 MG/1
TABLET ORAL
Qty: 30 TABLET | Refills: 0 | OUTPATIENT
Start: 2024-08-27

## 2024-08-27 RX ORDER — POTASSIUM CHLORIDE 1500 MG/1
TABLET, EXTENDED RELEASE ORAL
Qty: 7 TABLET | Refills: 0 | OUTPATIENT
Start: 2024-08-27

## 2024-08-27 RX ORDER — MORPHINE SULFATE 20 MG/ML
SOLUTION ORAL
Qty: 30 ML | Refills: 0 | OUTPATIENT
Start: 2024-08-27